# Patient Record
Sex: FEMALE | Race: WHITE | NOT HISPANIC OR LATINO | RURAL
[De-identification: names, ages, dates, MRNs, and addresses within clinical notes are randomized per-mention and may not be internally consistent; named-entity substitution may affect disease eponyms.]

---

## 2022-04-19 ENCOUNTER — OFFICE VISIT (OUTPATIENT)
Dept: RURAL CLINIC 6 | Facility: CLINIC | Age: 72
End: 2022-04-19
Payer: MEDICARE

## 2022-04-19 ENCOUNTER — WEB ENCOUNTER (OUTPATIENT)
Dept: RURAL CLINIC 2 | Facility: CLINIC | Age: 72
End: 2022-04-19

## 2022-04-19 ENCOUNTER — DASHBOARD ENCOUNTERS (OUTPATIENT)
Age: 72
End: 2022-04-19

## 2022-04-19 DIAGNOSIS — R19.5 OCCULT BLOOD POSITIVE STOOL: ICD-10-CM

## 2022-04-19 DIAGNOSIS — R13.19 ESOPHAGEAL DYSPHAGIA: ICD-10-CM

## 2022-04-19 PROCEDURE — 99203 OFFICE O/P NEW LOW 30 MIN: CPT | Performed by: INTERNAL MEDICINE

## 2022-04-19 RX ORDER — ALLOPURINOL 300 MG/1
1 TABLET TABLET ORAL ONCE A DAY
Status: ACTIVE | COMMUNITY

## 2022-04-19 RX ORDER — GABAPENTIN 300 MG/1
1 CAPSULE CAPSULE ORAL ONCE A DAY
Status: ACTIVE | COMMUNITY

## 2022-04-19 RX ORDER — GLIMEPIRIDE 2 MG/1
1 TABLET WITH BREAKFAST OR THE FIRST MAIN MEAL OF THE DAY TABLET ORAL ONCE A DAY
Status: ACTIVE | COMMUNITY

## 2022-04-19 RX ORDER — ATENOLOL AND CHLORTHALIDONE 100; 25 MG/1; MG/1
1 TABLET TABLET ORAL ONCE A DAY
Status: ACTIVE | COMMUNITY

## 2022-04-19 RX ORDER — FUROSEMIDE 40 MG/1
1 TABLET TABLET ORAL ONCE A DAY
Status: ACTIVE | COMMUNITY

## 2022-04-19 RX ORDER — METHIMAZOLE 5 MG/1
1 TABLET TABLET ORAL ONCE A DAY
Status: ACTIVE | COMMUNITY

## 2022-04-19 NOTE — PHYSICAL EXAM CHEST:
no lesions,  no deformities,  no traumatic injuries,  no significant scars are present,  chest wall non-tender,  no masses present, breathing is unlabored without accessory muscle use, normal breath sounds. Scar upper chest from childhood surgery- removal of minimally developed fetal twin.

## 2022-04-19 NOTE — PHYSICAL EXAM CONSTITUTIONAL:
well developed, well nourished , in no acute distress , ambulating with walker, weak LEs, normal communication ability

## 2022-04-19 NOTE — HPI-TODAY'S VISIT:
This is a 71-year-old female referred for a positive stool occult blood test.  Her main complaint however is difficulty swallowing solids.  She has no difficulty with liquids.  She had a similar problem 3 years ago and underwent upper endoscopy and colonoscopy with [facility] and dilation of his stricture which resulted in transient improvement for at least 6 months.  She now has similar recurrent symptoms.  She does not have heartburn.  She has not seen visible bleeding.  History of prior colon polyps last colonoscopy 3 years ago.  History of partial colon resection for a hard fecalith causing obstruction.  No history of colon cancer personally or in the family.  Her weight interval has been good.  Her cardiac status and respiratory status are stable.  She has had lower extremity weakness knee Problems and has been in and out of wheelchair and with difficulty ambulating since childhood.  She currently spends most of her day in a wheelchair although is able to ambulate with a walker. Needed assistance getting on exam table.

## 2022-07-26 ENCOUNTER — OFFICE VISIT (OUTPATIENT)
Dept: RURAL MEDICAL CENTER 3 | Facility: MEDICAL CENTER | Age: 72
End: 2022-07-26
Payer: MEDICARE

## 2022-07-26 ENCOUNTER — TELEPHONE ENCOUNTER (OUTPATIENT)
Dept: URBAN - METROPOLITAN AREA CLINIC 92 | Facility: CLINIC | Age: 72
End: 2022-07-26

## 2022-07-26 DIAGNOSIS — K92.2 ACUTE GASTROINTESTINAL BLEEDING: ICD-10-CM

## 2022-07-26 DIAGNOSIS — R13.10 ABNORMAL DEGLUTITION: ICD-10-CM

## 2022-07-26 DIAGNOSIS — K29.60 ADENOPAPILLOMATOSIS GASTRICA: ICD-10-CM

## 2022-07-26 DIAGNOSIS — K31.89 ACQUIRED DEFORMITY OF DUODENUM: ICD-10-CM

## 2022-07-26 PROBLEM — 428283002: Status: ACTIVE | Noted: 2022-07-26

## 2022-07-26 PROCEDURE — 43249 ESOPH EGD DILATION <30 MM: CPT | Performed by: INTERNAL MEDICINE

## 2022-07-26 PROCEDURE — 43239 EGD BIOPSY SINGLE/MULTIPLE: CPT | Performed by: INTERNAL MEDICINE

## 2022-07-26 RX ORDER — GLIMEPIRIDE 2 MG/1
1 TABLET WITH BREAKFAST OR THE FIRST MAIN MEAL OF THE DAY TABLET ORAL ONCE A DAY
Status: ACTIVE | COMMUNITY

## 2022-07-26 RX ORDER — ATENOLOL AND CHLORTHALIDONE 100; 25 MG/1; MG/1
1 TABLET TABLET ORAL ONCE A DAY
Status: ACTIVE | COMMUNITY

## 2022-07-26 RX ORDER — METHIMAZOLE 5 MG/1
1 TABLET TABLET ORAL ONCE A DAY
Status: ACTIVE | COMMUNITY

## 2022-07-26 RX ORDER — FUROSEMIDE 40 MG/1
1 TABLET TABLET ORAL ONCE A DAY
Status: ACTIVE | COMMUNITY

## 2022-07-26 RX ORDER — GABAPENTIN 300 MG/1
1 CAPSULE CAPSULE ORAL ONCE A DAY
Status: ACTIVE | COMMUNITY

## 2022-07-26 RX ORDER — ALLOPURINOL 300 MG/1
1 TABLET TABLET ORAL ONCE A DAY
Status: ACTIVE | COMMUNITY

## 2022-10-11 ENCOUNTER — OFFICE VISIT (OUTPATIENT)
Dept: RURAL MEDICAL CENTER 3 | Facility: MEDICAL CENTER | Age: 72
End: 2022-10-11
Payer: MEDICARE

## 2022-10-11 DIAGNOSIS — K92.1 ACUTE MELENA: ICD-10-CM

## 2022-10-11 DIAGNOSIS — K63.89 BACTERIAL OVERGROWTH SYNDROME: ICD-10-CM

## 2022-10-11 PROCEDURE — 45380 COLONOSCOPY AND BIOPSY: CPT | Performed by: INTERNAL MEDICINE

## 2022-10-11 RX ORDER — GABAPENTIN 300 MG/1
1 CAPSULE CAPSULE ORAL ONCE A DAY
Status: ACTIVE | COMMUNITY

## 2022-10-11 RX ORDER — FUROSEMIDE 40 MG/1
1 TABLET TABLET ORAL ONCE A DAY
Status: ACTIVE | COMMUNITY

## 2022-10-11 RX ORDER — ALLOPURINOL 300 MG/1
1 TABLET TABLET ORAL ONCE A DAY
Status: ACTIVE | COMMUNITY

## 2022-10-11 RX ORDER — METHIMAZOLE 5 MG/1
1 TABLET TABLET ORAL ONCE A DAY
Status: ACTIVE | COMMUNITY

## 2022-10-11 RX ORDER — GLIMEPIRIDE 2 MG/1
1 TABLET WITH BREAKFAST OR THE FIRST MAIN MEAL OF THE DAY TABLET ORAL ONCE A DAY
Status: ACTIVE | COMMUNITY

## 2022-10-11 RX ORDER — ATENOLOL AND CHLORTHALIDONE 100; 25 MG/1; MG/1
1 TABLET TABLET ORAL ONCE A DAY
Status: ACTIVE | COMMUNITY